# Patient Record
Sex: MALE | Race: WHITE | NOT HISPANIC OR LATINO | Employment: PART TIME | URBAN - METROPOLITAN AREA
[De-identification: names, ages, dates, MRNs, and addresses within clinical notes are randomized per-mention and may not be internally consistent; named-entity substitution may affect disease eponyms.]

---

## 2017-03-27 ENCOUNTER — HOSPITAL ENCOUNTER (OUTPATIENT)
Dept: RADIOLOGY | Facility: CLINIC | Age: 52
Discharge: HOME | End: 2017-03-27
Payer: OTHER MISCELLANEOUS

## 2017-03-27 ENCOUNTER — TRANSCRIBE ORDERS (OUTPATIENT)
Dept: ADMINISTRATIVE | Facility: HOSPITAL | Age: 52
End: 2017-03-27

## 2017-03-27 ENCOUNTER — ALLSCRIPTS OFFICE VISIT (OUTPATIENT)
Dept: OTHER | Facility: OTHER | Age: 52
End: 2017-03-27

## 2017-03-27 DIAGNOSIS — R20.0 TACTILE ANESTHESIA: ICD-10-CM

## 2017-03-27 DIAGNOSIS — M54.40 ACUTE RIGHT-SIDED LOW BACK PAIN WITH SCIATICA, SCIATICA LATERALITY UNSPECIFIED: Primary | ICD-10-CM

## 2017-03-27 DIAGNOSIS — M54.9 DORSALGIA: ICD-10-CM

## 2017-03-27 DIAGNOSIS — M62.81 MUSCLE WEAKNESS (GENERALIZED): ICD-10-CM

## 2017-03-27 DIAGNOSIS — M54.17 LUMBOSACRAL NEURITIS: ICD-10-CM

## 2017-03-27 PROCEDURE — 72110 X-RAY EXAM L-2 SPINE 4/>VWS: CPT

## 2017-04-10 ENCOUNTER — ALLSCRIPTS OFFICE VISIT (OUTPATIENT)
Dept: OTHER | Facility: OTHER | Age: 52
End: 2017-04-10

## 2017-04-26 ENCOUNTER — ALLSCRIPTS OFFICE VISIT (OUTPATIENT)
Dept: OTHER | Facility: OTHER | Age: 52
End: 2017-04-26

## 2017-04-28 ENCOUNTER — GENERIC CONVERSION - ENCOUNTER (OUTPATIENT)
Dept: OTHER | Facility: OTHER | Age: 52
End: 2017-04-28

## 2017-05-11 ENCOUNTER — GENERIC CONVERSION - ENCOUNTER (OUTPATIENT)
Dept: OTHER | Facility: OTHER | Age: 52
End: 2017-05-11

## 2017-05-11 ENCOUNTER — APPOINTMENT (OUTPATIENT)
Dept: PHYSICAL THERAPY | Facility: CLINIC | Age: 52
End: 2017-05-11
Payer: OTHER MISCELLANEOUS

## 2017-05-11 DIAGNOSIS — M62.81 MUSCLE WEAKNESS (GENERALIZED): ICD-10-CM

## 2017-05-11 PROCEDURE — 97162 PT EVAL MOD COMPLEX 30 MIN: CPT

## 2017-05-15 ENCOUNTER — APPOINTMENT (OUTPATIENT)
Dept: PHYSICAL THERAPY | Facility: CLINIC | Age: 52
End: 2017-05-15
Payer: OTHER MISCELLANEOUS

## 2017-05-15 PROCEDURE — 97110 THERAPEUTIC EXERCISES: CPT

## 2017-05-15 PROCEDURE — 97010 HOT OR COLD PACKS THERAPY: CPT

## 2017-05-22 ENCOUNTER — APPOINTMENT (OUTPATIENT)
Dept: PHYSICAL THERAPY | Facility: CLINIC | Age: 52
End: 2017-05-22
Payer: OTHER MISCELLANEOUS

## 2017-05-22 DIAGNOSIS — M62.81 MUSCLE WEAKNESS (GENERALIZED): ICD-10-CM

## 2017-05-22 DIAGNOSIS — M54.50 LOW BACK PAIN: ICD-10-CM

## 2017-05-22 DIAGNOSIS — R20.0 ANESTHESIA OF SKIN: ICD-10-CM

## 2017-05-22 DIAGNOSIS — M51.26 OTHER INTERVERTEBRAL DISC DISPLACEMENT, LUMBAR REGION: ICD-10-CM

## 2017-05-22 DIAGNOSIS — M54.9 DORSALGIA: ICD-10-CM

## 2017-05-22 DIAGNOSIS — M54.17 RADICULOPATHY OF LUMBOSACRAL REGION: ICD-10-CM

## 2017-05-22 PROCEDURE — 97014 ELECTRIC STIMULATION THERAPY: CPT

## 2017-05-22 PROCEDURE — 97110 THERAPEUTIC EXERCISES: CPT

## 2017-05-22 PROCEDURE — 97140 MANUAL THERAPY 1/> REGIONS: CPT

## 2017-05-22 PROCEDURE — 97010 HOT OR COLD PACKS THERAPY: CPT

## 2017-05-22 PROCEDURE — 97535 SELF CARE MNGMENT TRAINING: CPT

## 2017-06-07 ENCOUNTER — ALLSCRIPTS OFFICE VISIT (OUTPATIENT)
Dept: OTHER | Facility: OTHER | Age: 52
End: 2017-06-07

## 2017-06-15 ENCOUNTER — HOSPITAL ENCOUNTER (OUTPATIENT)
Dept: RADIOLOGY | Facility: CLINIC | Age: 52
Discharge: HOME | End: 2018-01-11
Attending: ANESTHESIOLOGY
Payer: OTHER MISCELLANEOUS

## 2017-06-29 ENCOUNTER — ALLSCRIPTS OFFICE VISIT (OUTPATIENT)
Dept: RADIOLOGY | Facility: CLINIC | Age: 52
End: 2017-06-29
Payer: OTHER MISCELLANEOUS

## 2017-07-17 ENCOUNTER — ALLSCRIPTS OFFICE VISIT (OUTPATIENT)
Dept: RADIOLOGY | Facility: CLINIC | Age: 52
End: 2017-07-17
Payer: COMMERCIAL

## 2017-07-20 ENCOUNTER — GENERIC CONVERSION - ENCOUNTER (OUTPATIENT)
Dept: OTHER | Facility: OTHER | Age: 52
End: 2017-07-20

## 2017-07-31 ENCOUNTER — ALLSCRIPTS OFFICE VISIT (OUTPATIENT)
Dept: RADIOLOGY | Facility: CLINIC | Age: 52
End: 2017-07-31
Payer: OTHER MISCELLANEOUS

## 2017-08-02 ENCOUNTER — GENERIC CONVERSION - ENCOUNTER (OUTPATIENT)
Dept: OTHER | Facility: OTHER | Age: 52
End: 2017-08-02

## 2017-08-10 ENCOUNTER — GENERIC CONVERSION - ENCOUNTER (OUTPATIENT)
Dept: OTHER | Facility: OTHER | Age: 52
End: 2017-08-10

## 2018-01-10 NOTE — MISCELLANEOUS
Message   Date: 02 Aug 2017 10:09 AM EST, Recorded By: Selinda Olszewski For: Kae Morales   Caller: Dalila Walton, RN  ,    Received fax from Dalila Walton, Nurse  with [facility]  Services to fax KESHA procedure note from 7/31/17 for patient to her  I faxed to [phone]  Active Problems    1  Back pain (724 5) (M54 9)   2  L4-L5 disc bulge (722 10) (M51 26)   3  Lumbar back pain (724 2) (M54 5)   4  Lumbosacral radiculopathy at L5 (724 4) (M54 17)   5  Muscle weakness of lower extremity (728 87) (M62 81)   6  Numbness of lower extremity (782 0) (R20 0)    Allergies    1  Penicillins    Signatures   Electronically signed by :  Alexa Ureña, ; Aug  2 2017 10:13AM EST                       (Author)

## 2018-01-10 NOTE — MISCELLANEOUS
Message   Recorded as Task   Date: 08/01/2017 02:11 PM, Created By: Gray Mckeonalgo   Task Name: Follow Up   Assigned To: [facility] [place] [facility],Team   Regarding Patient: Ilene Mina, Status: In Progress   MarimarDipika Rich - 01 Aug 2017 2:11 PM     TASK CREATED  S/P B/L SIJ on 07/31/2017 w/[facility] [place]  F/u w/DG scheduled on 08/18/2017 @2:15        Please call on 08/07/2017   Gray Barnardo - 07 Aug 2017 10:40 AM     TASK EDITED  1st attempt unable to [facility]   Gray Barnardo - 09 Aug 2017 10:37 AM     TASK EDITED  2nd attempt LM to [address] [place] [address] - 09 Aug 2017 11:51 AM     TASK EDITED  PT RETURNED Teddy Galvez W/SERVICE   Gray Mckeonalgo - 09 Aug 2017 2:22 PM     TASK EDITED  Patient states he is still having pain at L5  When he goes to bed he cant sleep  Pain level is a 9/10  Conf f/u w/DG on 08/18/2017  Jose Mejia - 09 Aug 2017 4:10 PM     TASK REASSIGNED: Previously Assigned To Jose Mejia  PATIENT NEEDS F/U WITH DR Alejandro Guerrero not need f/u with Shagufta Junior - 10 Aug 2017 11:13 AM     TASK EDITED  LMOM to cb  Provided cb number and office hours - via  #[phone]   Shagufta Hawkins - 10 Aug 2017 11:28 AM     TASK EDITED  s/w pt via   Advised of above  Provided Dr Wilson Kind contact info  pt verbalized understanding  Confirmed 8/18 ov w/ DG is cancelled  Active Problems    1  Back pain (724 5) (M54 9)   2  L4-L5 disc bulge (722 10) (M51 26)   3  Lumbar back pain (724 2) (M54 5)   4  Lumbosacral radiculopathy at L5 (724 4) (M54 17)   5  Muscle weakness of lower extremity (728 87) (M62 81)   6  Numbness of lower extremity (782 0) (R20 0)    Allergies    1   Penicillins    Signatures   Electronically signed by : Aureliano Hoffmann, ; Aug 10 2017 11:29AM EST                       (Author)

## 2018-01-11 NOTE — RESULT NOTES
Message   Recorded as Task   Date: 06/15/2017 10:15 AM, Created By: Malik Rees   Task Name: Miscellaneous   Assigned To: Bonnie Colón   Regarding Patient: Demi Dodson, Status: Active   Comment:    Bonnie Colón - 15 Kevin 2017 10:15 AM     TASK CREATED  r/s's pts procedures: no interuppter     new dates: LESI (L4-5) 6/29/2017                     LESI (L5-S1) 7/17/2017                     B/L SIJ 7/31/2017    with pt and pt's friend who was under complete understanding of policy and relayed to pt of reason why  pt's reads lips, and friend hears if speak loudly all appts coordinated and sign language to pt for a full understanding including reviewing of instructions      Maite Robert line [phone] ext [phone] called s/w Raeann Guerrero and scheduled all 3 procedure dates with her for pt on 6/15/2017 @ 10:10   Jose Mejia - 15 Kevin 2017 10:24 AM     TASK REPLIED TO: Previously Assigned To Jose Mejia  aware

## 2018-01-13 VITALS
WEIGHT: 192.38 LBS | HEIGHT: 69 IN | BODY MASS INDEX: 28.49 KG/M2 | HEART RATE: 94 BPM | DIASTOLIC BLOOD PRESSURE: 76 MMHG | SYSTOLIC BLOOD PRESSURE: 116 MMHG

## 2018-01-13 NOTE — CONSULTS
Therapy  Rehabilitation Services Referral:   Diagnosis: Lumbar spine pain, lower extremity weakness  Radiculopathy  Rehabilitation Services: evaluate and treat patient as needed  Exercise/Treatment: AROM/PROM, stretching, general fitness/back program, stabilization and strengthening/PRE  Frequency: 2-3 times per week, for 6 weeks  Please send progress report  I hereby certify that the services indicated above are medically necessary  Signatures   Electronically signed by :  Nikko Holt, Izzy Flanagan; May 22 2017  1:54PM EST                       (Author)

## 2018-01-14 VITALS
HEIGHT: 69 IN | SYSTOLIC BLOOD PRESSURE: 113 MMHG | BODY MASS INDEX: 28.88 KG/M2 | DIASTOLIC BLOOD PRESSURE: 76 MMHG | WEIGHT: 195 LBS | HEART RATE: 80 BPM

## 2018-01-14 VITALS
BODY MASS INDEX: 29 KG/M2 | DIASTOLIC BLOOD PRESSURE: 74 MMHG | SYSTOLIC BLOOD PRESSURE: 130 MMHG | WEIGHT: 196.38 LBS | HEART RATE: 86 BPM

## 2018-01-14 VITALS
BODY MASS INDEX: 29.4 KG/M2 | SYSTOLIC BLOOD PRESSURE: 122 MMHG | DIASTOLIC BLOOD PRESSURE: 78 MMHG | TEMPERATURE: 97.8 F | WEIGHT: 198.5 LBS | HEIGHT: 69 IN

## 2023-05-01 ENCOUNTER — OFFICE VISIT (OUTPATIENT)
Dept: URGENT CARE | Facility: CLINIC | Age: 58
End: 2023-05-01

## 2023-05-01 VITALS
SYSTOLIC BLOOD PRESSURE: 122 MMHG | TEMPERATURE: 98.5 F | RESPIRATION RATE: 16 BRPM | BODY MASS INDEX: 27.99 KG/M2 | WEIGHT: 189 LBS | HEIGHT: 69 IN | HEART RATE: 65 BPM | DIASTOLIC BLOOD PRESSURE: 80 MMHG | OXYGEN SATURATION: 96 %

## 2023-05-01 DIAGNOSIS — S70.311A: Primary | ICD-10-CM

## 2023-05-01 RX ORDER — ROSUVASTATIN CALCIUM 20 MG/1
20 TABLET, COATED ORAL
COMMUNITY
Start: 2023-04-17

## 2023-05-01 RX ORDER — PREDNISONE 20 MG/1
20 TABLET ORAL DAILY
COMMUNITY

## 2023-05-01 RX ORDER — BUPROPION HYDROCHLORIDE 150 MG/1
150 TABLET, EXTENDED RELEASE ORAL
COMMUNITY
Start: 2023-04-17

## 2023-05-01 RX ORDER — PANTOPRAZOLE SODIUM 40 MG/1
40 TABLET, DELAYED RELEASE ORAL
COMMUNITY
Start: 2023-04-17

## 2023-05-01 NOTE — PROGRESS NOTES
[facility]        NAME: Edyta Everett is a 65 y o  male  : 1965    MRN: [id number]  DATE: May 1, 2023  TIME: 3:20 PM    Assessment and Plan   Abrasion of skin of right thigh [S70 311A]  1  Abrasion of skin of right thigh            Pt is DEAF  Communication completed using pen and paper or phone  Patient Instructions   Keep area clean  Clean daily with gentle soap and water  Do not us alcohol or peroxide  Monitor for signs of infection including redness, swelling, drainage, streaking up the extremity and fever  Area may become bruised on the next few days  Apply ice for the first 3 days  Use Tylenol and ibuprofen as needed for pain  Follow up with PCP in 3-5 days  Proceed to  ER if symptoms worsen  Chief Complaint     Chief Complaint   Patient presents with    Laceration     Right thigh cut with metal 2x4 about 1 hour ago; last tetanus unknown         History of Present Illness       Patient is a 60-year-old male who is clinically completely deaf presents the office complaining of abrasion to anterior right thigh from metal being which occurred approximately 1 hour ago  Communication completed using pen and paper or phone translation  Pain is currently 6 out of 10  He did not clean the wound  Last tetanus unknown  Per chart review, td given 3/2022  Review of Systems   Review of Systems   Skin: Positive for wound           Current Medications       Current Outpatient Medications:     buPROPion (WELLBUTRIN SR) 150 mg 12 hr tablet, Take 150 mg by mouth, Disp: , Rfl:     pantoprazole (PROTONIX) 40 mg tablet, Take 40 mg by mouth, Disp: , Rfl:     predniSONE 20 mg tablet, Take 20 mg by mouth daily, Disp: , Rfl:     rosuvastatin (CRESTOR) 20 MG tablet, Take 20 mg by mouth, Disp: , Rfl:     Current Allergies     Allergies as of 2023 - Reviewed 2023   Allergen Reaction Noted    Penicillins Hives 2013    Latex Rash 2023            The following portions "of the patient's history were reviewed and updated as appropriate: allergies, current medications, past family history, past medical history, past social history, past surgical history and problem list      Past Medical History:   Diagnosis Date    Anxiety     GERD (gastroesophageal reflux disease)     Hyperlipidemia        Past Surgical History:   Procedure Laterality Date    CYST REMOVAL      neck       History reviewed  No pertinent family history  Medications have been verified  Objective   /80   Pulse 65   Temp 98 5 °F (36 9 °C)   Resp 16   Ht 5' 9\" (1 753 m)   Wt 85 7 kg (189 lb)   SpO2 96%   BMI 27 91 kg/m²   No LMP for male patient  Physical Exam     Physical Exam  Vitals and nursing note reviewed  Constitutional:       Appearance: He is well-developed  HENT:      Head: Normocephalic and atraumatic  Right Ear: External ear normal       Left Ear: External ear normal       Nose: Nose normal    Eyes:      General: Lids are normal       Conjunctiva/sclera: Conjunctivae normal    Skin:     General: Skin is warm and dry  Capillary Refill: Capillary refill takes less than 2 seconds  Findings: Wound present  Comments: approx 16cm superficial skin abrasion to anterior right thigh  No active bleeding  Mild TTP  Wound cleaned with dermal cleanser  Topical abx applied then clean dressing  Neurological:      Mental Status: He is alert                      "

## 2023-05-01 NOTE — PATIENT INSTRUCTIONS
Keep area clean  Clean daily with gentle soap and water  Do not us alcohol or peroxide  Monitor for signs of infection including redness, swelling, drainage, streaking up the extremity and fever  Area may become bruised on the next few days  Apply ice for the first 3 days  Use Tylenol and ibuprofen as needed for pain  Follow up with your PCP in 3-5 days  Go to ER if symptoms become severe

## 2023-07-29 ENCOUNTER — OFFICE VISIT (OUTPATIENT)
Dept: URGENT CARE | Facility: MEDICAL CENTER | Age: 58
End: 2023-07-29
Payer: COMMERCIAL

## 2023-07-29 ENCOUNTER — APPOINTMENT (OUTPATIENT)
Dept: RADIOLOGY | Facility: MEDICAL CENTER | Age: 58
End: 2023-07-29
Attending: PHYSICIAN ASSISTANT
Payer: COMMERCIAL

## 2023-07-29 VITALS
SYSTOLIC BLOOD PRESSURE: 126 MMHG | RESPIRATION RATE: 18 BRPM | HEART RATE: 66 BPM | DIASTOLIC BLOOD PRESSURE: 78 MMHG | TEMPERATURE: 98.1 F | OXYGEN SATURATION: 95 %

## 2023-07-29 DIAGNOSIS — S91.312A LACERATION OF LEFT FOOT, INITIAL ENCOUNTER: Primary | ICD-10-CM

## 2023-07-29 DIAGNOSIS — M79.672 LEFT FOOT PAIN: ICD-10-CM

## 2023-07-29 PROCEDURE — 99213 OFFICE O/P EST LOW 20 MIN: CPT | Performed by: PHYSICIAN ASSISTANT

## 2023-07-29 PROCEDURE — 90715 TDAP VACCINE 7 YRS/> IM: CPT

## 2023-07-29 PROCEDURE — 90471 IMMUNIZATION ADMIN: CPT | Performed by: PHYSICIAN ASSISTANT

## 2023-07-29 PROCEDURE — 73630 X-RAY EXAM OF FOOT: CPT

## 2023-07-29 PROCEDURE — 12031 INTMD RPR S/A/T/EXT 2.5 CM/<: CPT | Performed by: PHYSICIAN ASSISTANT

## 2023-07-29 NOTE — PATIENT INSTRUCTIONS
1. Keep skin clean and dry  2. Apply topical antibiotics and dressing daily  3. Watch for S&S of infection (redness, swelling, drainage, fever)  4.  Return for suture removal in 7-10 days

## 2023-07-29 NOTE — PROGRESS NOTES
[facility]        NAME: Yanira Ellis is a 65 y.o. male  : 1965    MRN: [id number]  DATE: 2023  TIME: 5:21 PM    Assessment and Plan   Laceration of left foot, initial encounter [S91.312A]  1. Laceration of left foot, initial encounter  Laceration repair    Tdap Vaccine greater than or equal to 8yo      2. Left foot pain  XR foot 3+ vw left            Patient Instructions     1. Keep skin clean and dry  2. Apply topical antibiotics and dressing daily  3. Watch for S&S of infection (redness, swelling, drainage, fever)  4. Return for suture removal in 7-10 days      Chief Complaint     Chief Complaint   Patient presents with   • Foot Injury     Patient was changing oil in car and there was a ramp that ran into left foot. Wound to left foot. History of Present Illness       Carmen Mcneal is a 56-year-old male who presents with a small laceration on his left foot after a work accident earlier today. Patient reports he was working on his vehicle changing the oil when his foot slipped and struck the edge of a metal ramp. Patient received a small laceration to the medial aspect of his left foot. He was able to clean and dry the wound but it continued to bleed. Patient also reports pain on the top part of his foot after the incident. He denies any prior foot or ankle injuries. He is unaware of his last tetanus vaccine. Review of Systems   Review of Systems   Constitutional: Negative. Musculoskeletal: Negative. Skin: Positive for wound. Neurological: Negative for weakness and numbness.          Current Medications       Current Outpatient Medications:   •  buPROPion (WELLBUTRIN SR) 150 mg 12 hr tablet, Take 150 mg by mouth, Disp: , Rfl:   •  pantoprazole (PROTONIX) 40 mg tablet, Take 40 mg by mouth, Disp: , Rfl:   •  predniSONE 20 mg tablet, Take 20 mg by mouth daily, Disp: , Rfl:   •  rosuvastatin (CRESTOR) 20 MG tablet, Take 20 mg by mouth, Disp: , Rfl:     Current Allergies     Allergies as of 07/29/2023 - Reviewed 07/29/2023   Allergen Reaction Noted   • Penicillins Hives 02/18/2013   • Latex Rash 02/07/2023            The following portions of the patient's history were reviewed and updated as appropriate: allergies, current medications, past family history, past medical history, past social history, past surgical history and problem list.     Past Medical History:   Diagnosis Date   • Anxiety    • GERD (gastroesophageal reflux disease)    • Hyperlipidemia        Past Surgical History:   Procedure Laterality Date   • CYST REMOVAL      neck       No family history on file. Medications have been verified. Objective   /78   Pulse 66   Temp 98.1 °F (36.7 °C)   Resp 18   SpO2 95%   No LMP for male patient. Physical Exam     Physical Exam  Constitutional:       General: He is not in acute distress. Appearance: Normal appearance. He is not ill-appearing. Cardiovascular:      Rate and Rhythm: Normal rate and regular rhythm. Heart sounds: Normal heart sounds, S1 normal and S2 normal. No murmur heard. Pulmonary:      Effort: Pulmonary effort is normal.      Breath sounds: Normal breath sounds and air entry. Feet:      Comments: There is some tenderness to palpation over the shaft of the first metatarsal.  No palpable crepitus or deformity. Skin:     Comments: There is a 1.5 cm laceration noted of over the medial aspect of the left foot, skin edges are well approximated but there is active bleeding. Neurological:      Mental Status: He is alert. Universal Protocol:  Consent: Verbal consent obtained.   Consent given by: patient  Patient understanding: patient states understanding of the procedure being performed  Patient consent: the patient's understanding of the procedure matches consent given    Laceration repair    Date/Time: 7/29/2023 3:00 PM    Performed by: Maribel Suero PA-C  Authorized by: Maribel Suero PA-C  Foreign bodies: metal  Anesthesia: local infiltration    Anesthesia:  Local Anesthetic: lidocaine 1% without epinephrine  Anesthetic total: 2 mL      Procedure Details:  Irrigation solution: saline  Amount of cleaning: standard  Debridement: none  Degree of undermining: none  Skin closure: 5-0 nylon  Number of sutures: 2  Technique: simple  Approximation: close  Approximation difficulty: simple  Dressing: antibiotic ointment and gauze packing  Patient tolerance: patient tolerated the procedure well with no immediate complications      Initial review of x-ray reveals no acute fractures or dislocations, final results as per radiology review.

## 2023-07-29 NOTE — LETTER
July 29, 2023     Patient: Georgia Vasquez   YOB: 1965   Date of Visit: 7/29/2023       To Whom It May Concern: It is my medical opinion that Anila Wilkinson may return to work on 7/31/23 . If you have any questions or concerns, please don't hesitate to call.          Sincerely,        Geraldo Stevenson PA-C    CC: No Recipients